# Patient Record
Sex: MALE
[De-identification: names, ages, dates, MRNs, and addresses within clinical notes are randomized per-mention and may not be internally consistent; named-entity substitution may affect disease eponyms.]

---

## 2018-05-07 ENCOUNTER — HOSPITAL ENCOUNTER (OUTPATIENT)
Dept: HOSPITAL 14 - H.OPSURG | Age: 23
Discharge: HOME | End: 2018-05-07
Attending: UROLOGY
Payer: MEDICAID

## 2018-05-07 VITALS — RESPIRATION RATE: 18 BRPM

## 2018-05-07 VITALS
HEART RATE: 68 BPM | SYSTOLIC BLOOD PRESSURE: 106 MMHG | TEMPERATURE: 97.8 F | DIASTOLIC BLOOD PRESSURE: 76 MMHG | OXYGEN SATURATION: 98 %

## 2018-05-07 VITALS — BODY MASS INDEX: 29.5 KG/M2

## 2018-05-07 DIAGNOSIS — N35.9: Primary | ICD-10-CM

## 2018-05-07 DIAGNOSIS — R33.8: ICD-10-CM

## 2018-05-07 PROCEDURE — 52281 CYSTOSCOPY AND TREATMENT: CPT

## 2018-05-07 RX ADMIN — HYDROMORPHONE HYDROCHLORIDE PRN MG: 1 INJECTION, SOLUTION INTRAMUSCULAR; INTRAVENOUS; SUBCUTANEOUS at 11:38

## 2018-05-07 RX ADMIN — HYDROMORPHONE HYDROCHLORIDE PRN MG: 1 INJECTION, SOLUTION INTRAMUSCULAR; INTRAVENOUS; SUBCUTANEOUS at 11:53

## 2018-05-15 NOTE — OP
PROCEDURE DATE:  05/07/2018



PREOPERATIVE DIAGNOSIS:  Urethral stricture.



POSTOPERATIVE DIAGNOSIS:  Urethral stricture.



PROCEDURE PERFORMED:  Cystoscopy with urethral dilatation.



DESCRIPTION OF PROCEDURE:  Under general anesthesia, the patient was placed

on the operating room table in dorsal lithotomy position.  The area of the

groin was draped and prepped in the sterile manner.  I inserted a #21

cystoscope into the urethra and at the mid portion of the urethra could not

pass the cystoscope, it came obstructing to a urethral stricture.  I then

was able to dilate that with appropriate sounds, went to the level of a

size 26 Upper sorbian, then I reinserted the 21 cystoscope, I can see three areas

of annular stricture that were one behind the other in the area of the mid

urethra.  Once pass that, the verumontanum was then clearly identified. 

Prostate is not occlusive in nature, it is open, going into the bladder,

there was no significant trabeculation, and ureteral orifice was seen.  No

unusual findings on the bladder wall.  Once this identification was made,

then the patient was taken from the operating room in good condition.





__________________________________________

Hayley Sebastian MD





DD:  05/15/2018 15:50:42

DT:  05/15/2018 19:29:25

Job # [id number]